# Patient Record
Sex: MALE | Race: WHITE | NOT HISPANIC OR LATINO | Employment: OTHER | URBAN - METROPOLITAN AREA
[De-identification: names, ages, dates, MRNs, and addresses within clinical notes are randomized per-mention and may not be internally consistent; named-entity substitution may affect disease eponyms.]

---

## 2018-09-06 NOTE — PATIENT DISCUSSION
Dry Eye Syndrome (JOSE) Counseling: Dry Eye Syndrome, also known as Keratoconjunctivitis Sicca, is a common condition that occurs when your tears are not able to provide adequate lubrication for your eyes. Symptoms can be exacerbated by environmental factors such as smoke, wind, or prolonged eye use. Treatment options include, but are not limited to, artificial tears, punctal plugs, Restasis, oral omega-3 supplements, and lubricating ointments. I have explained to the patient that successful management is dependent on patient compliance with treatment as prescribed and/or the treatment regimen.

## 2018-09-06 NOTE — PATIENT DISCUSSION
Posterior Capsular Fibrosis/Opacification Counseling: I have discussed the option of glasses versus YAG laser surgery versus  following. It was explained that when vision no longer meets the patient's needs, and when a new prescription for glasses is not likely to improve the patient's visual symptoms, the option of YAG laser surgery is a reasonable next step. It was  explained that there is no guarantee that removing the PCF/PCO will improve their visual symptoms. The risks, benefits and alternatives of surgery were discussed with the patient. The uncommon risk of an increase in intraocular pressure or a retinal detachment and their associated symptoms were explained to the patient. After this discussion, the patient desires to proceed with YAG laser to improve vision to improve her quality of life.

## 2018-10-01 NOTE — PATIENT DISCUSSION
Posterior Capsular Fibrosis/Opacification Counseling: I have discussed the option of glasses versus YAG laser surgery versus  following. It was explained that when vision no longer meets the patient's needs, and when a new prescription for glasses is not likely to improve the patient's visual symptoms, the option of YAG laser surgery is a reasonable next step. It was  explained that there is no guarantee that removing the PCF/PCO will improve their visual symptoms. The risks, benefits and alternatives of surgery were discussed with the patient. The uncommon risk of an increase in intraocular pressure or a retinal detachment and their associated symptoms were explained to the patient. After this discussion, the patient desires to proceed with YAG laser to improve vision and reduce glare.

## 2018-10-01 NOTE — PATIENT DISCUSSION
S/P YAG, OD - OPERATIVE EYE IS STABLE AND OK TO PROCEED WITH THE FELLOW EYE'S SURGERY. PT REPORTS THAT THEY ARE HAPPY WITH THE OUTCOME OF THE OPERATIVE EYE AND READY TO PURSUE SX IN THE FELLOW EYE.

## 2019-11-05 ENCOUNTER — NEW PATIENT (OUTPATIENT)
Dept: URBAN - METROPOLITAN AREA CLINIC 32 | Facility: CLINIC | Age: 64
End: 2019-11-05

## 2019-11-05 DIAGNOSIS — H53.8: ICD-10-CM

## 2019-11-05 PROCEDURE — 92004 COMPRE OPH EXAM NEW PT 1/>: CPT

## 2019-11-05 ASSESSMENT — VISUAL ACUITY
OD_SC: 20/200+1
OS_BAT: 20/200
OS_CC: J1+
OD_SC: J1+
OD_BAT: 20/30-1
OS_SC: J1+
OS_CC: 20/60-1
OS_PH: 20/40
OD_CC: 20/20
OD_CC: J3
OS_SC: 20/400

## 2019-11-05 ASSESSMENT — KERATOMETRY
OD_AXISANGLE_DEGREES: 115
OD_AXISANGLE2_DEGREES: 25
OS_AXISANGLE2_DEGREES: 95
OS_AXISANGLE_DEGREES: 5
OD_K1POWER_DIOPTERS: 44.25
OD_K2POWER_DIOPTERS: 43.5
OS_K1POWER_DIOPTERS: 43.5

## 2019-11-05 ASSESSMENT — TONOMETRY
OD_IOP_MMHG: 12
OS_IOP_MMHG: 12

## 2019-11-13 ENCOUNTER — SURGICAL TESTING (OUTPATIENT)
Dept: URBAN - METROPOLITAN AREA CLINIC 32 | Facility: CLINIC | Age: 64
End: 2019-11-13

## 2019-11-13 DIAGNOSIS — H25.12: ICD-10-CM

## 2019-11-13 DIAGNOSIS — H35.363: ICD-10-CM

## 2019-11-13 DIAGNOSIS — H25.011: ICD-10-CM

## 2019-11-13 PROCEDURE — 92136 OPHTHALMIC BIOMETRY: CPT

## 2019-11-13 PROCEDURE — 92025 CPTRIZED CORNEAL TOPOGRAPHY: CPT

## 2019-11-13 PROCEDURE — 92136TC INTERFEROMETRY - TECHNICAL COMPONENT

## 2019-11-13 PROCEDURE — 92134 CPTRZ OPH DX IMG PST SGM RTA: CPT

## 2019-11-13 PROCEDURE — 92286 ANT SGM IMG I&R SPECLR MIC: CPT

## 2019-11-13 PROCEDURE — 92012 INTRM OPH EXAM EST PATIENT: CPT

## 2019-11-13 ASSESSMENT — KERATOMETRY
OS_K1POWER_DIOPTERS: 43.5
OD_K2POWER_DIOPTERS: 43.5
OD_AXISANGLE_DEGREES: 115
OS_AXISANGLE2_DEGREES: 95
OD_K1POWER_DIOPTERS: 44.25
OS_AXISANGLE_DEGREES: 5
OD_AXISANGLE2_DEGREES: 25

## 2020-03-03 ENCOUNTER — ESTABLISHED COMPREHENSIVE EXAM (OUTPATIENT)
Dept: URBAN - METROPOLITAN AREA CLINIC 32 | Facility: CLINIC | Age: 65
End: 2020-03-03

## 2020-03-03 DIAGNOSIS — H25.011: ICD-10-CM

## 2020-03-03 PROCEDURE — 92012 INTRM OPH EXAM EST PATIENT: CPT

## 2020-03-03 ASSESSMENT — KERATOMETRY
OS_AXISANGLE2_DEGREES: 95
OD_AXISANGLE_DEGREES: 115
OD_K1POWER_DIOPTERS: 44.25
OS_K1POWER_DIOPTERS: 43.5
OD_AXISANGLE2_DEGREES: 25
OS_AXISANGLE_DEGREES: 5
OD_K2POWER_DIOPTERS: 43.5

## 2020-03-03 ASSESSMENT — VISUAL ACUITY
OS_SC: 20/400
OS_CC: J2
OD_CC: 20/20-2
OD_SC: J1+
OS_SC: J3
OS_CC: 20/60
OD_SC: 20/200
OD_CC: J2

## 2020-03-03 ASSESSMENT — TONOMETRY
OD_IOP_MMHG: 12
OS_IOP_MMHG: 12

## 2020-03-11 ENCOUNTER — SURGERY/PROCEDURE (OUTPATIENT)
Dept: URBAN - METROPOLITAN AREA CLINIC 32 | Facility: CLINIC | Age: 65
End: 2020-03-11

## 2020-03-11 DIAGNOSIS — H25.811: ICD-10-CM

## 2020-03-11 PROCEDURE — 66984CV REMOVE CATARACT, INSERT LENS, CUSTOM VISION

## 2020-03-11 PROCEDURE — S9986T TECHNOLOGY PKG - PF

## 2020-03-12 ENCOUNTER — CATARACT POST-OP 1-DAY (OUTPATIENT)
Dept: URBAN - METROPOLITAN AREA CLINIC 32 | Facility: CLINIC | Age: 65
End: 2020-03-12

## 2020-03-12 DIAGNOSIS — H25.012: ICD-10-CM

## 2020-03-12 PROCEDURE — 92012 INTRM OPH EXAM EST PATIENT: CPT

## 2020-03-12 ASSESSMENT — TONOMETRY: OD_IOP_MMHG: 15

## 2020-03-12 ASSESSMENT — KERATOMETRY
OD_K1POWER_DIOPTERS: 44.25
OS_AXISANGLE_DEGREES: 5
OD_K2POWER_DIOPTERS: 43.5
OS_AXISANGLE2_DEGREES: 95
OD_AXISANGLE2_DEGREES: 25
OD_AXISANGLE_DEGREES: 115
OS_K1POWER_DIOPTERS: 43.5

## 2020-03-12 ASSESSMENT — VISUAL ACUITY: OD_SC: 20/20

## 2020-03-16 ENCOUNTER — POST OP/EVAL OF SECOND EYE (OUTPATIENT)
Dept: URBAN - METROPOLITAN AREA CLINIC 32 | Facility: CLINIC | Age: 65
End: 2020-03-16

## 2020-03-16 DIAGNOSIS — H25.012: ICD-10-CM

## 2020-03-16 PROCEDURE — 92012 INTRM OPH EXAM EST PATIENT: CPT

## 2020-03-16 ASSESSMENT — KERATOMETRY
OS_AXISANGLE2_DEGREES: 95
OD_AXISANGLE_DEGREES: 109
OD_K2POWER_DIOPTERS: 42.75
OD_K1POWER_DIOPTERS: 44
OD_K2POWER_DIOPTERS: 43.5
OD_AXISANGLE2_DEGREES: 19
OS_K1POWER_DIOPTERS: 43.5
OD_AXISANGLE_DEGREES: 115
OS_AXISANGLE_DEGREES: 5
OD_K1POWER_DIOPTERS: 44.25
OD_AXISANGLE2_DEGREES: 25

## 2020-03-16 ASSESSMENT — VISUAL ACUITY
OS_GLARE: <20/400
OS_SC: 20/400
OS_SC: J3
OD_SC: 20/20

## 2020-03-16 ASSESSMENT — TONOMETRY
OD_IOP_MMHG: 13
OS_IOP_MMHG: 14

## 2020-03-18 ENCOUNTER — SURGERY/PROCEDURE (OUTPATIENT)
Dept: URBAN - METROPOLITAN AREA CLINIC 32 | Facility: CLINIC | Age: 65
End: 2020-03-18

## 2020-03-18 DIAGNOSIS — H25.812: ICD-10-CM

## 2020-03-18 PROCEDURE — S9986T TECHNOLOGY PKG - PF

## 2020-03-18 PROCEDURE — 66984CV REMOVE CATARACT, INSERT LENS, CUSTOM VISION

## 2020-03-18 ASSESSMENT — KERATOMETRY
OS_K1POWER_DIOPTERS: 43.5
OD_K1POWER_DIOPTERS: 44.25
OD_AXISANGLE_DEGREES: 115
OD_K2POWER_DIOPTERS: 43.5
OS_AXISANGLE_DEGREES: 5
OD_AXISANGLE2_DEGREES: 25
OS_AXISANGLE2_DEGREES: 95

## 2020-03-19 ENCOUNTER — CATARACT POST-OP 1-DAY (OUTPATIENT)
Dept: URBAN - METROPOLITAN AREA CLINIC 32 | Facility: CLINIC | Age: 65
End: 2020-03-19

## 2020-03-19 DIAGNOSIS — H25.012: ICD-10-CM

## 2020-03-19 DIAGNOSIS — Z96.1: ICD-10-CM

## 2020-03-19 PROCEDURE — 99024 POSTOP FOLLOW-UP VISIT: CPT

## 2020-03-19 ASSESSMENT — KERATOMETRY
OD_AXISANGLE2_DEGREES: 19
OD_AXISANGLE2_DEGREES: 25
OD_K1POWER_DIOPTERS: 44
OS_AXISANGLE_DEGREES: 5
OD_AXISANGLE_DEGREES: 115
OD_AXISANGLE_DEGREES: 109
OD_K1POWER_DIOPTERS: 44.25
OS_AXISANGLE2_DEGREES: 95
OS_K1POWER_DIOPTERS: 43.5
OD_K2POWER_DIOPTERS: 43.5
OD_K2POWER_DIOPTERS: 42.75

## 2020-03-19 ASSESSMENT — TONOMETRY
OS_IOP_MMHG: 14
OD_IOP_MMHG: 14

## 2020-03-19 ASSESSMENT — VISUAL ACUITY
OD_SC: 20/30+2
OS_SC: 20/50
OS_PH: 20/40+1

## 2020-03-24 ENCOUNTER — POST-OP CATARACT (OUTPATIENT)
Dept: URBAN - METROPOLITAN AREA CLINIC 32 | Facility: CLINIC | Age: 65
End: 2020-03-24

## 2020-03-24 DIAGNOSIS — Z96.1: ICD-10-CM

## 2020-03-24 PROCEDURE — 99024 POSTOP FOLLOW-UP VISIT: CPT

## 2020-03-24 ASSESSMENT — KERATOMETRY
OD_K1POWER_DIOPTERS: 44
OS_AXISANGLE2_DEGREES: 95
OS_K1POWER_DIOPTERS: 43.5
OD_K2POWER_DIOPTERS: 43.5
OD_K1POWER_DIOPTERS: 44
OD_AXISANGLE2_DEGREES: 19
OD_AXISANGLE_DEGREES: 109
OD_AXISANGLE2_DEGREES: 25
OD_K2POWER_DIOPTERS: 42.75
OS_AXISANGLE_DEGREES: 5
OD_K1POWER_DIOPTERS: 44.25
OS_AXISANGLE_DEGREES: 5
OS_K1POWER_DIOPTERS: 43.5
OD_AXISANGLE2_DEGREES: 25
OD_AXISANGLE2_DEGREES: 19
OD_K2POWER_DIOPTERS: 42.75
OD_AXISANGLE_DEGREES: 109
OD_K1POWER_DIOPTERS: 44.25
OD_AXISANGLE_DEGREES: 115
OS_AXISANGLE2_DEGREES: 95
OD_AXISANGLE_DEGREES: 115
OD_K2POWER_DIOPTERS: 43.5

## 2020-03-24 ASSESSMENT — VISUAL ACUITY
OS_SC: 20/30
OD_SC: 20/25

## 2020-03-24 ASSESSMENT — TONOMETRY: OS_IOP_MMHG: 13

## 2020-06-15 ENCOUNTER — POST-OP CATARACT (OUTPATIENT)
Dept: URBAN - METROPOLITAN AREA CLINIC 32 | Facility: CLINIC | Age: 65
End: 2020-06-15

## 2020-06-15 DIAGNOSIS — Z96.1: ICD-10-CM

## 2020-06-15 PROCEDURE — 99024 POSTOP FOLLOW-UP VISIT: CPT

## 2020-06-15 ASSESSMENT — KERATOMETRY
OD_AXISANGLE2_DEGREES: 25
OD_AXISANGLE_DEGREES: 115
OS_K1POWER_DIOPTERS: 43.5
OS_AXISANGLE_DEGREES: 5
OD_AXISANGLE2_DEGREES: 19
OS_AXISANGLE2_DEGREES: 95
OD_AXISANGLE_DEGREES: 109
OD_K2POWER_DIOPTERS: 42.75
OD_K2POWER_DIOPTERS: 43.5
OD_K1POWER_DIOPTERS: 44.25
OD_K1POWER_DIOPTERS: 44

## 2020-06-15 ASSESSMENT — TONOMETRY
OS_IOP_MMHG: 11
OD_IOP_MMHG: 11

## 2020-06-15 ASSESSMENT — VISUAL ACUITY
OD_SC: 20/20+2
OS_SC: 20/15

## 2020-07-11 NOTE — PATIENT DISCUSSION
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 TREAT MGD FIRST THEN CONSIDER OTHER TREATMENT OPTIONS IF SYMPTOMS PERSIST. No

## 2021-08-04 ENCOUNTER — ESTABLISHED COMPREHENSIVE EXAM (OUTPATIENT)
Dept: URBAN - METROPOLITAN AREA CLINIC 32 | Facility: CLINIC | Age: 66
End: 2021-08-04

## 2021-08-04 DIAGNOSIS — E11.9: ICD-10-CM

## 2021-08-04 DIAGNOSIS — H02.052: ICD-10-CM

## 2021-08-04 DIAGNOSIS — H02.055: ICD-10-CM

## 2021-08-04 PROCEDURE — 92015 DETERMINE REFRACTIVE STATE: CPT

## 2021-08-04 PROCEDURE — 92014 COMPRE OPH EXAM EST PT 1/>: CPT

## 2021-08-04 PROCEDURE — 67820 REVISE EYELASHES: CPT

## 2021-08-04 ASSESSMENT — KERATOMETRY
OD_AXISANGLE_DEGREES: 109
OD_K2POWER_DIOPTERS: 43.5
OD_AXISANGLE2_DEGREES: 19
OD_K1POWER_DIOPTERS: 44
OS_AXISANGLE2_DEGREES: 95
OD_AXISANGLE_DEGREES: 115
OS_K1POWER_DIOPTERS: 43.5
OD_K2POWER_DIOPTERS: 42.75
OS_AXISANGLE_DEGREES: 5
OD_K1POWER_DIOPTERS: 44.25
OD_AXISANGLE2_DEGREES: 25

## 2021-08-04 ASSESSMENT — TONOMETRY
OS_IOP_MMHG: 12
OD_IOP_MMHG: 11

## 2021-08-04 ASSESSMENT — VISUAL ACUITY
OS_SC: 20/20
OS_CC: J1+
OD_SC: J5
OD_CC: J1+
OS_SC: J5-1
OD_SC: 20/20

## 2022-08-25 ENCOUNTER — COMPREHENSIVE EXAM (OUTPATIENT)
Dept: URBAN - METROPOLITAN AREA CLINIC 32 | Facility: CLINIC | Age: 67
End: 2022-08-25

## 2022-08-25 DIAGNOSIS — H43.812: ICD-10-CM

## 2022-08-25 DIAGNOSIS — H02.045: ICD-10-CM

## 2022-08-25 DIAGNOSIS — H35.363: ICD-10-CM

## 2022-08-25 DIAGNOSIS — Z96.1: ICD-10-CM

## 2022-08-25 DIAGNOSIS — E11.9: ICD-10-CM

## 2022-08-25 DIAGNOSIS — H04.123: ICD-10-CM

## 2022-08-25 PROCEDURE — 99214 OFFICE O/P EST MOD 30 MIN: CPT

## 2022-08-25 ASSESSMENT — KERATOMETRY
OD_AXISANGLE2_DEGREES: 19
OD_K1POWER_DIOPTERS: 44.25
OD_AXISANGLE_DEGREES: 115
OD_AXISANGLE_DEGREES: 109
OD_K2POWER_DIOPTERS: 42.75
OD_AXISANGLE2_DEGREES: 25
OD_K2POWER_DIOPTERS: 43.5
OD_K1POWER_DIOPTERS: 44
OS_K1POWER_DIOPTERS: 43.5
OS_AXISANGLE_DEGREES: 5
OS_AXISANGLE2_DEGREES: 95

## 2022-08-25 ASSESSMENT — VISUAL ACUITY
OS_CC: J1+
OD_SC: 20/25
OD_CC: J1+
OS_SC: J2
OD_SC: J5
OS_SC: 20/25

## 2022-08-25 ASSESSMENT — TONOMETRY
OD_IOP_MMHG: 12
OS_IOP_MMHG: 12

## 2023-08-29 ENCOUNTER — COMPREHENSIVE EXAM (OUTPATIENT)
Dept: URBAN - METROPOLITAN AREA CLINIC 32 | Facility: CLINIC | Age: 68
End: 2023-08-29

## 2023-08-29 DIAGNOSIS — H26.493: ICD-10-CM

## 2023-08-29 DIAGNOSIS — H43.812: ICD-10-CM

## 2023-08-29 DIAGNOSIS — H16.223: ICD-10-CM

## 2023-08-29 DIAGNOSIS — H02.045: ICD-10-CM

## 2023-08-29 DIAGNOSIS — H35.363: ICD-10-CM

## 2023-08-29 DIAGNOSIS — E11.9: ICD-10-CM

## 2023-08-29 PROCEDURE — 99214 OFFICE O/P EST MOD 30 MIN: CPT

## 2023-08-29 PROCEDURE — 92134 CPTRZ OPH DX IMG PST SGM RTA: CPT

## 2023-08-29 ASSESSMENT — KERATOMETRY
OS_AXISANGLE_DEGREES: 7
OS_AXISANGLE2_DEGREES: 97
OD_AXISANGLE_DEGREES: 118
OD_AXISANGLE2_DEGREES: 28
OS_K1POWER_DIOPTERS: 43.50
OS_K2POWER_DIOPTERS: 43.25
OD_K1POWER_DIOPTERS: 45.00
OD_K2POWER_DIOPTERS: 43.50

## 2023-08-29 ASSESSMENT — VISUAL ACUITY
OD_SC: J1
OD_SC: 20/20-2
OS_SC: 20/20-2
OS_SC: J5-2

## 2023-08-29 ASSESSMENT — TONOMETRY
OD_IOP_MMHG: 12
OS_IOP_MMHG: 11

## 2024-08-30 ENCOUNTER — COMPREHENSIVE EXAM (OUTPATIENT)
Dept: URBAN - METROPOLITAN AREA CLINIC 32 | Facility: CLINIC | Age: 69
End: 2024-08-30

## 2024-08-30 DIAGNOSIS — E11.9: ICD-10-CM

## 2024-08-30 DIAGNOSIS — H43.812: ICD-10-CM

## 2024-08-30 DIAGNOSIS — H16.223: ICD-10-CM

## 2024-08-30 DIAGNOSIS — H35.363: ICD-10-CM

## 2024-08-30 DIAGNOSIS — H26.493: ICD-10-CM

## 2024-08-30 PROCEDURE — 99214 OFFICE O/P EST MOD 30 MIN: CPT

## 2024-08-30 ASSESSMENT — KERATOMETRY
OD_K2POWER_DIOPTERS: 43.50
OD_K1POWER_DIOPTERS: 45.00
OS_AXISANGLE_DEGREES: 7
OS_AXISANGLE2_DEGREES: 97
OS_K2POWER_DIOPTERS: 43.25
OD_AXISANGLE2_DEGREES: 28
OD_AXISANGLE_DEGREES: 118
OS_K1POWER_DIOPTERS: 43.50

## 2024-08-30 ASSESSMENT — VISUAL ACUITY
OS_SC: 20/20-2
OD_GLARE: 20/30
OD_CC: J1+
OS_GLARE: 20/30
OD_SC: 20/25
OS_CC: J1+

## 2024-08-30 ASSESSMENT — TONOMETRY
OD_IOP_MMHG: 13
OS_IOP_MMHG: 11

## 2024-10-02 NOTE — PATIENT DISCUSSION
Ice 20 minutes 3-4 times per day for swelling, pain or after exercise.    Elevate affected extremity above your heart when possible for swelling.  Over the counter Voltaren or Diclofenac anti inflammatory gel.  Apply to skin over area of pain 4 times daily as directed.  Avoid any painful activity or exercise. Use a cane for weight bearing as tolerated without pain.  You can do non impact exercise like biking, Nustep or swimming as tolerated without pain.  Over the counter Tylenol as needed for pain- you can take 1,000 mg Tylenol every 8 hours as needed.  Home exercises as given today- do rehabilitative exercises daily.  Physical therapy was ordered for a left knee joint  brace.  Monitor your short and long term relief after today's left knee joint US-guided cortisone injection.    Using a Cane  A cane helps you get around on your own. Many different canes are available. The most common type has a single tip. But if you have balance problems, your healthcare provider may recommend that you use a quad (four-point) cane. Always use your cane on the stronger (uninjured or unaffected) side, unless told otherwise. Use the cane on the side opposite your weaker leg.  Walking    1.  Put all your weight on your stronger leg.  Find your balance.  Move the cane and your weaker leg forward.      2.  Support your weight on both the cane and the affected leg.  Then step through with your stronger leg.  Put your weight on the weaker leg and start the next step.      When using a quad cane, place the cane so that all of the tips touch the ground.       Up stairs and curbs  If there is a railing, hold on to it with your free hand.  Step up with your stronger leg first.  Then move the cane and weaker leg together as a unit.      Down stairs and curbs  To walk down, step down with your weaker leg and the cane first.  Then follow with your stronger leg.    Date Last Reviewed: 9/1/2015  © 7039-6001 [employer] 800  POSTERIOR CAPSULAR FIBROSIS/OPACIFICATION, OU - VISUALLY SIGNIFICANT. SCHEDULE YAG CAPSULOTOMY OD FIRST THEN LATER OS IF [place]IS[place] [address]. All rights reserved. This information is not intended as a substitute for professional medical care. Always follow your healthcare professional's instructions.

## 2025-08-29 ENCOUNTER — COMPREHENSIVE EXAM (OUTPATIENT)
Age: 70
End: 2025-08-29

## 2025-08-29 DIAGNOSIS — H02.052: ICD-10-CM

## 2025-08-29 DIAGNOSIS — E11.9: ICD-10-CM

## 2025-08-29 DIAGNOSIS — H43.812: ICD-10-CM

## 2025-08-29 DIAGNOSIS — H16.223: ICD-10-CM

## 2025-08-29 DIAGNOSIS — H02.055: ICD-10-CM

## 2025-08-29 DIAGNOSIS — H35.363: ICD-10-CM

## 2025-08-29 DIAGNOSIS — H26.493: ICD-10-CM

## 2025-08-29 PROCEDURE — 99214 OFFICE O/P EST MOD 30 MIN: CPT

## 2025-08-29 PROCEDURE — 92134 CPTRZ OPH DX IMG PST SGM RTA: CPT
